# Patient Record
Sex: MALE | URBAN - METROPOLITAN AREA
[De-identification: names, ages, dates, MRNs, and addresses within clinical notes are randomized per-mention and may not be internally consistent; named-entity substitution may affect disease eponyms.]

---

## 2023-04-19 ENCOUNTER — TELEPHONE (OUTPATIENT)
Dept: FAMILY MEDICINE CLINIC | Facility: CLINIC | Age: 33
End: 2023-04-19

## 2023-04-25 ENCOUNTER — TELEPHONE (OUTPATIENT)
Dept: FAMILY MEDICINE CLINIC | Facility: CLINIC | Age: 33
End: 2023-04-25

## 2023-04-25 NOTE — TELEPHONE ENCOUNTER
----- Message from Hieu Canseco sent at 4/17/2023  2:38 PM EDT -----  Subject: Appointment Request    Reason for Call: New Patient/New to Provider Appointment needed: Routine   Physical Exam    QUESTIONS    Reason for appointment request? Requested Provider unavailable - Nelly Garber MD     Additional Information for Provider? New PT/Provider, would like to Est   New care with requested PCP. PT would also like a Yr Phy with LABS/BW. has   not had a PCP in a very longtime. Please contact PT to schedule New PT   appt with Phy. and LABS/BW. PT is available anytime on a Monday Preferred.   ---------------------------------------------------------------------------  --------------  Luis LEAL  [phone]; OK to leave message on voicemail  ---------------------------------------------------------------------------  --------------  SCRIPT ANSWERS  COVID Screen: Y[facility] Love

## 2023-04-25 NOTE — TELEPHONE ENCOUNTER
LVM for patient informing him that Dr. Lance Hancock is not currently taking new patients and advised we have one provider currently taking new patients but her next available NP appointment is currently December 2023. Requested a call back if patient is still interested in scheduling.